# Patient Record
Sex: FEMALE | Race: OTHER | NOT HISPANIC OR LATINO
[De-identification: names, ages, dates, MRNs, and addresses within clinical notes are randomized per-mention and may not be internally consistent; named-entity substitution may affect disease eponyms.]

---

## 2023-01-31 PROBLEM — Z00.00 ENCOUNTER FOR PREVENTIVE HEALTH EXAMINATION: Status: ACTIVE | Noted: 2023-01-31

## 2023-02-01 ENCOUNTER — APPOINTMENT (OUTPATIENT)
Dept: VASCULAR SURGERY | Facility: CLINIC | Age: 73
End: 2023-02-01
Payer: MEDICARE

## 2023-02-01 VITALS — SYSTOLIC BLOOD PRESSURE: 127 MMHG | HEART RATE: 67 BPM | DIASTOLIC BLOOD PRESSURE: 69 MMHG

## 2023-02-01 DIAGNOSIS — Z82.49 FAMILY HISTORY OF ISCHEMIC HEART DISEASE AND OTHER DISEASES OF THE CIRCULATORY SYSTEM: ICD-10-CM

## 2023-02-01 DIAGNOSIS — R42 DIZZINESS AND GIDDINESS: ICD-10-CM

## 2023-02-01 DIAGNOSIS — J45.909 UNSPECIFIED ASTHMA, UNCOMPLICATED: ICD-10-CM

## 2023-02-01 DIAGNOSIS — R51.9 HEADACHE, UNSPECIFIED: ICD-10-CM

## 2023-02-01 DIAGNOSIS — F17.200 NICOTINE DEPENDENCE, UNSPECIFIED, UNCOMPLICATED: ICD-10-CM

## 2023-02-01 DIAGNOSIS — Z83.3 FAMILY HISTORY OF DIABETES MELLITUS: ICD-10-CM

## 2023-02-01 DIAGNOSIS — H57.9 UNSPECIFIED DISORDER OF EYE AND ADNEXA: ICD-10-CM

## 2023-02-01 DIAGNOSIS — Z82.0 FAMILY HISTORY OF EPILEPSY AND OTHER DISEASES OF THE NERVOUS SYSTEM: ICD-10-CM

## 2023-02-01 DIAGNOSIS — I10 ESSENTIAL (PRIMARY) HYPERTENSION: ICD-10-CM

## 2023-02-01 DIAGNOSIS — E78.5 HYPERLIPIDEMIA, UNSPECIFIED: ICD-10-CM

## 2023-02-01 DIAGNOSIS — F31.9 BIPOLAR DISORDER, UNSPECIFIED: ICD-10-CM

## 2023-02-01 LAB — ERYTHROCYTE [SEDIMENTATION RATE] IN BLOOD BY WESTERGREN METHOD: 3 MM/HR

## 2023-02-01 PROCEDURE — 99203 OFFICE O/P NEW LOW 30 MIN: CPT

## 2023-02-01 PROCEDURE — 93880 EXTRACRANIAL BILAT STUDY: CPT

## 2023-02-02 PROBLEM — E78.5 HLD (HYPERLIPIDEMIA): Status: ACTIVE | Noted: 2023-02-02

## 2023-02-02 PROBLEM — Z82.49 FAMILY HISTORY OF HYPERTENSION: Status: ACTIVE | Noted: 2023-02-02

## 2023-02-02 PROBLEM — R51.9 TEMPORAL PAIN: Status: ACTIVE | Noted: 2023-02-02

## 2023-02-02 PROBLEM — I10 HTN (HYPERTENSION): Status: ACTIVE | Noted: 2023-02-02

## 2023-02-02 PROBLEM — H57.9 EYE PRESSURE: Status: ACTIVE | Noted: 2023-02-02

## 2023-02-02 PROBLEM — F31.9 BIPOLAR AND RELATED DISORDER: Status: RESOLVED | Noted: 2023-02-02 | Resolved: 2023-02-02

## 2023-02-02 PROBLEM — F17.200 CURRENT EVERY DAY SMOKER: Status: ACTIVE | Noted: 2023-02-02

## 2023-02-02 PROBLEM — Z82.0 FAMILY HISTORY OF EPILEPSY: Status: ACTIVE | Noted: 2023-02-02

## 2023-02-02 PROBLEM — R51.9 HEADACHE: Status: ACTIVE | Noted: 2023-02-02

## 2023-02-02 PROBLEM — Z82.49 FAMILY HISTORY OF CORONARY ARTERY DISEASE: Status: ACTIVE | Noted: 2023-02-02

## 2023-02-02 PROBLEM — J45.909 ASTHMA: Status: ACTIVE | Noted: 2023-02-02

## 2023-02-02 PROBLEM — R42 DIZZINESS, NONSPECIFIC: Status: ACTIVE | Noted: 2023-02-02

## 2023-02-02 PROBLEM — Z83.3 FAMILY HISTORY OF TYPE 2 DIABETES MELLITUS: Status: ACTIVE | Noted: 2023-02-02

## 2023-02-02 RX ORDER — MONTELUKAST 10 MG/1
10 TABLET, FILM COATED ORAL
Refills: 0 | Status: ACTIVE | COMMUNITY

## 2023-02-02 RX ORDER — HYDROCHLOROTHIAZIDE 25 MG/1
25 TABLET ORAL
Refills: 0 | Status: ACTIVE | COMMUNITY

## 2023-02-02 RX ORDER — MECLIZINE HCL 25 MG/1
25 TABLET ORAL
Refills: 0 | Status: ACTIVE | COMMUNITY

## 2023-02-02 RX ORDER — GABAPENTIN 300 MG
300 TABLET ORAL
Refills: 0 | Status: ACTIVE | COMMUNITY

## 2023-02-02 RX ORDER — NEOMYCIN SULFATE, POLYMYXIN B SULFATE AND DEXAMETHASONE 3.5; 10000; 1 MG/ML; [USP'U]/ML; MG/ML
3.5-10000-0.1 SUSPENSION OPHTHALMIC
Refills: 0 | Status: ACTIVE | COMMUNITY

## 2023-02-02 RX ORDER — FLUTICASONE PROPIONATE 50 UG/1
50 SPRAY, METERED NASAL
Refills: 0 | Status: ACTIVE | COMMUNITY

## 2023-02-08 NOTE — ASSESSMENT
[Arterial/Venous Disease] : arterial/venous disease [Smoking Cessation] : smoking cessation [FreeTextEntry1] : 71 y/o F who presented to r/o temporal arteritis however, no evidence of temporal artery inflammation, "donut sign" or wall thickening on either side via US.\par \par On exam, no carotid bruits. No focal neuro deficits. /69, HR 67.\par US compelted showed bilt temporal arteries patent. No "donut sign", no inflammation or wall thickening noted. BR ICA with no plaque. L ICA with <50% stenosis.\par Findings reviewed and discussed with patient. No vascular intervention recommended at this time. CRP to be completed today. Pt recommended to have an eval by neurologist

## 2023-02-08 NOTE — PROCEDURE
[FreeTextEntry1] : Arterial US ordered today to assess temporal artery, reveals: bilt temporal arteries patent. No "donut sign", no inflammation or wall thickening noted. BR ICA with no plaque. L ICA with <50% stenosis.\par

## 2023-02-08 NOTE — PHYSICAL EXAM
[Respiratory Effort] : normal respiratory effort [No Rash or Lesion] : No rash or lesion [Alert] : alert [Oriented to Person] : oriented to person [Oriented to Place] : oriented to place [Oriented to Time] : oriented to time [Calm] : calm [2+] : left 2+ [Right Carotid Bruit] : no bruit heard over the right carotid [Left Carotid Bruit] : no bruit heard over the left carotid [de-identified] : WN/WD [de-identified] : FROM

## 2023-02-08 NOTE — HISTORY OF PRESENT ILLNESS
[FreeTextEntry1] : 73 y/o F referred by Dr uRth for possible temporal arteritis. She has a PMHx of HLD, HTN, asthma, bilat retina procedures in 2021, ?bipolar disorder, dizziness/vertigo. Patient developed explains she developed jaw numbness/heaviness and body aches in [place], early 2022, and is concerned she had an TIA at the time. Today, she reports experiencing intermittent headaches for the past 2 weeks. She describes the headaches radiating slowly from the back of L eye to the back of the head. She reports the temporal are ais sensitive to touch and now the headaches are also on the right side since yesterday. Denies any dizziness, slurred speech, CP, SOB, one sided weakness. She explains she has been experiencing drainage from both her eyes for the past 2 months and saw her eye doctor but needs to see her again soon. No fever or chills. She has not had a neurological evaluation. \par \par \par Shx:\par -Active smoker\par \par \par Fhx: \par -Mother: hx of CAD, HTN\par -Father: unknown\par -Son: cancer and epilepsy\par -Son: passed, hx of T2DM, epilepsy\par -Son: hx asthma,, epilepsy\par -Bothers (x3): hx of CAD, some passed away and some are still alive

## 2023-02-08 NOTE — CONSULT LETTER
[Dear  ___] : Dear  [unfilled], [FreeTextEntry2] : Rom Ruth MD\par [address]\[place] [address]\par \par Joselin Whitten DO\par [address]\[place] [address]\par \par Trena Calzada MD\par [address] [facility]\[place] [address] [FreeTextEntry1] : I saw Ms Henna Boone for concerns of temporal arteritis. She reports headaches on the left side radiating from the back of the eye to the occipital area along with sensitivity over temporal area.  Several days ago the headaches also noted on the right side.  She denies any other current neurological symptoms. She describes an episode of jaw numbness, heaviness and body aches while in [place] in early 2022 which she thinks might have been a TIA. She has not had a neurological evaluation. All of her sons suffer from epilepsy.\par \par On exam there are no focal neurological deficits or carotid bruits. Blood pressure 127/69, heart rate 67 b/min.\par \par Ultrasound shows no evidence of temporal arteritis.  There is no temporal artery wall thickening, inflammation or the classic ultrasound "donut sign" indicative of this in either artery.  (CRP lab work completed after her visit was also low).\par \par I do not feel that Ms. Boone's symptoms are consistent with temporal arteritis.  I am confident that biopsy would not be revealing of anything but normal arteries  I recommend further pursuing a neurological cause for the headaches.  \par \par My complete EMR office note is below for your records.  [FreeTextEntry3] : Sincerely, \par \par Cyrus Barclay M.D. \par , [facility]\par , Department of Surgery [facility]\par Professor of Surgery, [employer] at [facility]

## 2023-02-08 NOTE — ADDENDUM
[FreeTextEntry1] : I, Dr. Cyrus Barclay, personally performed the evaluation and management (E/M) services for this new patient.  That E/M includes conducting the initial examination, assessing all conditions, and establishing the plan of care.  Today, my ACP, Angelia Shane NP, was here to observe my evaluation and management services for this patient to be followed going forward.